# Patient Record
(demographics unavailable — no encounter records)

---

## 2025-07-30 NOTE — HISTORY OF PRESENT ILLNESS
[FreeTextEntry1] : new patient eval and establish care [de-identified] : went to urgent care on 7-26 for a cough chest x-ray normal was given medication cough resolved  Mr. RUCKER is a 74 year-old male with PMHx of presenting today for new patient eval and establish care. Denies chest pain, sob, maldonado, dizziness, diaphoresis, palpitations, LE swelling, orthopnea, syncope, n/v, headache.

## 2025-07-30 NOTE — ADDENDUM
[FreeTextEntry1] :  This note was written by Haley Servin on Jul 30 2025  2:20PM acting as medical scribe for Dr. Luan Hill.I, Dr. Luan Hill, have read and attest that all the information, medical decision making and discharge instructions within are true and accurate.

## 2025-07-30 NOTE — HISTORY OF PRESENT ILLNESS
[FreeTextEntry1] : new patient eval and establish care [de-identified] : went to urgent care on 7-26 for a cough chest x-ray normal was given medication cough resolved  Mr. RUCKER is a 74 year-old male with PMHx of presenting today for new patient eval and establish care. Denies chest pain, sob, maldonado, dizziness, diaphoresis, palpitations, LE swelling, orthopnea, syncope, n/v, headache.